# Patient Record
(demographics unavailable — no encounter records)

---

## 2024-11-06 NOTE — REASON FOR VISIT
[Follow-Up Visit] : a follow-up visit for [Family Member] : family member [FreeTextEntry2] : left humeral fracture

## 2024-11-06 NOTE — DISCUSSION/SUMMARY
[Surgical risks reviewed] : Surgical risks reviewed [de-identified] : 82 y/o female presenting with a left humeral fracture s/p falls in [facility]  We discussed that these fractures frequently heal without operative intervention.  Research shows that operative and nonoperative management of fractures in this age group has about the same long-term outcomes.  The patient has significant osteopenia and likely osteoporosis and open reduction with internal fixation would likely be unsuccessful as such the options are either nonoperative or reverse total shoulder arthroplasty.  I would recommend nonoperative intervention at this time.    She will advance per the standard physical therapy pathway.  No restrictions at this time.  Would recommend follow-up when back home in [facility] with her primary care doctor for additional discussion of osteoporosis medications.  Osteopenia and osteoporosis are significant risk factors for fragility fractures. Given the type of fracture that has occurred, I would highly recommend that the patient followup with their primary care physician to discuss treatment for low bone mineral density. We discussed the benefits of these medications frequently far outweigh the small risks. Their primary care physician can call the office with any specific questions or concerns.  The patient was given the opportunity to ask questions and all questions were answered to their satisfaction.

## 2024-11-06 NOTE — PHYSICAL EXAM
[de-identified] : Physical Exam: General: Well appearing, no acute distress, A&O Neurologic: A&Ox3, No focal deficits Head: NCAT without abrasions, lacerations, or ecchymosis to head, face, or scalp Respiratory: Equal chest wall expansion bilaterally, no accessory muscle use Lymphatic: No lymphadenopathy palpated Skin: Warm and dry Psychiatric: Normal mood and affect  Left upper extremity: SILT r/m/u Fires AIN/PIN/ulnar 2+ radial pulse brisk capillary refill Shoulder abduction to 60 degrees, forward flexion to 60 degrees  [de-identified] : 2 views of the left shoulder obtained today show continued healing of the previously seen proximal humerus fracture with a moderate degree of impaction

## 2024-11-06 NOTE — HISTORY OF PRESENT ILLNESS
[de-identified] : 83-year-old female present today accompanied by daughter for evaluation of left shoulder pain.  The patient states that approximately 2 months ago she was in [facility] on vacation when she tripped over her foot landing on an outstretched left hand/left side.  She was seen at the ED at that point in time in [facility] where she was discharged with negative x-rays.  She was there for approximately a month with continued pain limited range of motion, etc. of the left shoulder.    Patient states today that she continues to have some pain and discomfort along the left shoulder but does note improvement.  Takes primarily ibuprofen.  The patient has been attending physical therapy twice a week for the past month to aid in the recovery of a fracture. The pain from the fracture has improved, but the patient is experiencing stiffness in the arm. The patient is aiming for a 90 range of motion to regain normal functionality. [3] : a current pain level of 3/10